# Patient Record
(demographics unavailable — no encounter records)

---

## 2025-06-03 NOTE — REASON FOR VISIT
[Subsequent Evaluation] : a subsequent evaluation for [Hearing Loss] : hearing loss [Parent] : parent [FreeTextEntry2] : ears and tonsils

## 2025-06-03 NOTE — ASSESSMENT
[FreeTextEntry1] : 9 year old female presents with fullness in both ears. On exam, impacted cerumen bilaterally, cleared today using curette and suction. Hearing loss - likely conductive resolved after disimpaction patient declined audiogram ear hygiene discussed preventive measures and signs of accumulation  tonsils a bit smaller, recurrent infections but not yet meet criterion for removal  Patient also with recurrent tonsillitis. On exam, 2+ tonsils. discussed indications for intervention

## 2025-06-03 NOTE — HISTORY OF PRESENT ILLNESS
[de-identified] : a few months of decrease hearing hard to hear on zoom and unclear what being said mom saw wax and feels she may have removed some  she has had 3 episodes of tonsillitis in last 6 months  mom states she has voice changes and sounds nasal, has frequent sore throats, saw pediatrician and was told she has enlarged tonsils, she gets pharyngitis often as well, mom uses a wet qtip on the outer canal, mom states she does snore but does not have any daytime sleepiness [FreeTextEntry1] : Patient following up with fullness in both ears. no Vertigo, tinnitus, pain, drainage or facial weakness. also nasal congestion intermittent. worse after swimming   Patient also with recurrent tonsillitis. Had 3-4 infections over this past summer. no more snoring

## 2025-06-03 NOTE — PROCEDURE
[FreeTextEntry3] : Procedure- removal of cerumen bilaterally Diagnosis - cerumen impaction bilateral ears found to have impacted cerumen - they were cleared with suction and curette, canals appeared normal. Procedure- removal of cerumen bilaterally

## 2025-06-03 NOTE — CONSULT LETTER
[Please see my note below.] : Please see my note below. [FreeTextEntry1] : Dear Dr. SALVADOR ORTIZ \par  I had the pleasure of evaluating your patient NATALIA AGUAYO, thank you for allowing us to participate in their care. please see full note detailing our visit below.\par  If you have any questions, please do not hesitate to call me and I would be happy to discuss further. \par  \par  Jason Madrigal M.D.\par  Attending Physician,  \par  Department of Otolaryngology - Head and Neck Surgery\par  [facility] \par  Office: [phone]\par  Fax: [phone]\par  \par

## 2025-06-03 NOTE — END OF VISIT
[FreeTextEntry3] : I personally saw and examined the patient in detail. I spoke to JOSUE Gordon regarding the assessment and plan of care.  I preformed the procedures and I reviewed the above assessment and plan of care, and agree. I have made changes in changes in the body of the note where appropriate.